# Patient Record
Sex: FEMALE | Race: BLACK OR AFRICAN AMERICAN | Employment: OTHER | URBAN - METROPOLITAN AREA
[De-identification: names, ages, dates, MRNs, and addresses within clinical notes are randomized per-mention and may not be internally consistent; named-entity substitution may affect disease eponyms.]

---

## 2017-10-02 ENCOUNTER — HOSPITAL ENCOUNTER (OUTPATIENT)
Age: 67
Discharge: HOME | End: 2017-10-02
Payer: COMMERCIAL

## 2017-10-02 LAB
ALBUMIN SERPL-MCNC: 4.2 G/DL (ref 3.5–5.2)
ALBUMIN/GLOBULIN RATIO: ABNORMAL (ref 1–2.5)
ALP BLD-CCNC: 82 U/L (ref 35–104)
ALT SERPL-CCNC: 16 U/L (ref 5–33)
ANION GAP SERPL CALCULATED.3IONS-SCNC: 10 MMOL/L (ref 9–17)
AST SERPL-CCNC: 23 U/L
BILIRUB SERPL-MCNC: 0.37 MG/DL (ref 0.3–1.2)
BUN BLDV-MCNC: 16 MG/DL (ref 8–23)
BUN/CREAT BLD: 36 (ref 9–20)
CALCIUM SERPL-MCNC: 9 MG/DL (ref 8.6–10.4)
CHLORIDE BLD-SCNC: 103 MMOL/L (ref 98–107)
CHOLESTEROL, FASTING: 225 MG/DL
CHOLESTEROL/HDL RATIO: 1.5
CO2: 29 MMOL/L (ref 20–31)
CREAT SERPL-MCNC: 0.44 MG/DL (ref 0.5–0.9)
GFR AFRICAN AMERICAN: >60 ML/MIN
GFR NON-AFRICAN AMERICAN: >60 ML/MIN
GFR SERPL CREATININE-BSD FRML MDRD: ABNORMAL ML/MIN/{1.73_M2}
GFR SERPL CREATININE-BSD FRML MDRD: ABNORMAL ML/MIN/{1.73_M2}
GLUCOSE FASTING: 102 MG/DL (ref 70–99)
HDLC SERPL-MCNC: 154 MG/DL
LDL CHOLESTEROL: 64 MG/DL (ref 0–130)
POTASSIUM SERPL-SCNC: 3.8 MMOL/L (ref 3.7–5.3)
SODIUM BLD-SCNC: 142 MMOL/L (ref 135–144)
THYROXINE, FREE: 1.41 NG/DL (ref 0.93–1.7)
TOTAL PROTEIN: 6.7 G/DL (ref 6.4–8.3)
TRIGLYCERIDE, FASTING: 33 MG/DL
TSH SERPL DL<=0.05 MIU/L-ACNC: 0.68 MIU/L (ref 0.3–5)
VLDLC SERPL CALC-MCNC: ABNORMAL MG/DL (ref 1–30)

## 2017-10-02 PROCEDURE — 80061 LIPID PANEL: CPT

## 2017-10-02 PROCEDURE — 84439 ASSAY OF FREE THYROXINE: CPT

## 2017-10-02 PROCEDURE — 80053 COMPREHEN METABOLIC PANEL: CPT

## 2017-10-02 PROCEDURE — 84443 ASSAY THYROID STIM HORMONE: CPT

## 2017-10-02 PROCEDURE — 93005 ELECTROCARDIOGRAM TRACING: CPT

## 2017-10-02 PROCEDURE — 36415 COLL VENOUS BLD VENIPUNCTURE: CPT

## 2017-10-03 LAB
EKG ATRIAL RATE: 60 BPM
EKG P AXIS: 77 DEGREES
EKG P-R INTERVAL: 138 MS
EKG Q-T INTERVAL: 430 MS
EKG QRS DURATION: 92 MS
EKG QTC CALCULATION (BAZETT): 430 MS
EKG R AXIS: 44 DEGREES
EKG T AXIS: 66 DEGREES
EKG VENTRICULAR RATE: 60 BPM

## 2017-10-16 ENCOUNTER — OFFICE VISIT (OUTPATIENT)
Dept: ORTHOPEDIC SURGERY | Age: 67
End: 2017-10-16
Payer: COMMERCIAL

## 2017-10-16 VITALS — BODY MASS INDEX: 17.18 KG/M2 | WEIGHT: 120 LBS | HEIGHT: 70 IN

## 2017-10-16 DIAGNOSIS — M17.12 PRIMARY OSTEOARTHRITIS OF LEFT KNEE: Primary | ICD-10-CM

## 2017-10-16 DIAGNOSIS — M25.561 CHRONIC PAIN OF RIGHT KNEE: ICD-10-CM

## 2017-10-16 DIAGNOSIS — M17.11 ARTHRITIS OF RIGHT KNEE: ICD-10-CM

## 2017-10-16 DIAGNOSIS — M76.52 PATELLAR TENDINITIS OF LEFT KNEE: ICD-10-CM

## 2017-10-16 DIAGNOSIS — G89.29 CHRONIC PAIN OF RIGHT KNEE: ICD-10-CM

## 2017-10-16 PROCEDURE — 99213 OFFICE O/P EST LOW 20 MIN: CPT | Performed by: ORTHOPAEDIC SURGERY

## 2017-10-16 NOTE — PROGRESS NOTES
[address] [place] [address]  Dept: [phone]  Dept Fax: [phone]        Ambulatory Follow Up      Subjective:   Brissa Parks is a 78y.o. year old female who presents to our office today for routine followup regarding her left knee pain. She was last seen approximately 2 years ago and had a corticosteroid injection to the left knee. He is felt fine ever since. She is beginning to have increasing aching type pain in the left knee. She is also noted popping and clicking right knee. The right knee is not quite as painful as the left. She isolates her pain to the anterior aspects of bilateral knees. She walks with a cane in the right hand. No other issues. Denies any numbness or tingling. Chief Complaint   Patient presents with    Joint Swelling     Bilateral knees, asking for injections        HPI    Review of Systems  Gen: no fever, chills, malaise  CV: no chest pain or palpitations  Resp: no cough or shortness of breath  Neuro: no numbness, tingling, or weakness  Msk: Bilateral knee pain    Objective :   General: Brissa Parks is a 78 y.o. female who is alert and oriented and sitting comfortably in our office. Ortho Exam  MS:  Bilateral lower extremities are warm and well perfused. Motor and sensation is grossly intact distal to the knee. Quad strength is 4+5 bilaterally. There is pain in the patellar tendon on the left and in the quad tendon on the right with resisted knee extension. Significant tenderness to palpation over the patellar tendon the left. Positive patellofemoral crepitus bilaterally. Mild lateral joint space tenderness to palpation on the left. Knee stable to varus and valgus stressing. Knee range of motion from 0-140 without pain. No pain or catching with Maximiliano's bilaterally. Neuro: alert.  oriented  Eyes: Extra-ocular muscles intact  Mouth: Oral mucosa moist. No perioral lesions  Pulm: Respirations unlabored and regular. Skin: warm, well perfused  Psych:   Patient has good fund of knowledge and displays understanging of exam, diagnosis, and plan. Radiology:  History: 72-year-old female bilateral knee pain    Comparison: 4 views of the left knee 11/16/2015    Findings: 4 views of bilateral knees are reviewed. There is tracking minimal arthritis bilaterally. There is progression in the left knee compared to prior films. The patellofemoral joint is significantly affected bilaterally. There is swelling noted anterior to the patellar tendons bilaterally. There are no lytic or blastic lesions. Impression: Tricompartmental arthritis bilaterally     Assessment:      1. Primary osteoarthritis of left knee    2. Chronic pain of right knee    3. Patellar tendinitis of left knee    4. Arthritis of right knee       Plan:   Patient's bilateral knee arthritis. Right knee is not bothering her enough for treatment today. She'll take anti-inflammatories as needed for that. As for the left knee majority of her pain is coming from her patellar tendinitis. We have prescribed physical therapy for eccentric exercises as well as quad stretching. We've also discussed ice massage using frozen [place] cups 3 times a day for 20 minutes at a time. We will delay doing another corticosteroid injection at this time. We'll save this for next visit if needed. We'll see her back in 3 months or as needed. Follow up:Return in about 3 months (around 1/16/2018), or if symptoms worsen or fail to improve. No orders of the defined types were placed in this encounter.          Orders Placed This Encounter   Procedures    XR KNEE LEFT (MIN 4 VIEWS)     Order Specific Question:   Reason for exam:     Answer:   left knee pain    XR KNEE RIGHT (MIN 4 VIEWS)     Order Specific Question:   Reason for exam:     Answer:   right knee pain and clicking    [facility]     Referral Priority:   Routine     Referral

## 2017-10-19 NOTE — PROGRESS NOTES
I performed a history and physical examination of the patient and discussed management with the resident. I reviewed the residents note and agree with the documented findings and plan of care. Any areas of disagreement are noted on the chart. I have personally evaluated this patient and have completed at least one if not all key elements of the E/M (history, physical exam, and MDM). Additional findings are as noted. I agree with the chief complaint, past medical history, past surgical history, allergies, medications, social and family history as documented unless otherwise noted below.      Electronically signed by Nicole Clement DO on 10/19/2017 at 7:45 AM

## 2017-10-24 ENCOUNTER — HOSPITAL ENCOUNTER (OUTPATIENT)
Dept: PHYSICAL THERAPY | Facility: CLINIC | Age: 67
Setting detail: THERAPIES SERIES
Discharge: HOME | End: 2017-10-24
Payer: COMMERCIAL

## 2017-10-24 PROCEDURE — 97110 THERAPEUTIC EXERCISES: CPT

## 2017-10-24 PROCEDURE — 97162 PT EVAL MOD COMPLEX 30 MIN: CPT

## 2017-10-24 NOTE — CONSULTS
Activity    [] Ultrasound  [x] Electrical Stimulation  [x] Gait Training     [x] Massage       [] Lumbar/Cervical Traction  [x] Neuromuscular Re-education [x] Cold/hotpack [] Iontophoresis: 4 mg/mL  [x] Instruction in HEP             Dexamethasone Sodium  [x] Manual Therapy             Phosphate 40-80 mAmin  [] Aquatic Therapy  [x] Vasocompression/    [] Other:       Game Ready    []  Medication allergies reviewed for use of    Dexamethasone Sodium Phosphate 4mg/ml     with iontophoresis treatments. Pt is not allergic. Frequency:  3 x/week for 18  visits        Todays Treatment:  Modalities:   Precautions: neuropathy of b/l feet    Exercises: BILATERAL   Exercise Reps/ Time Weight/ Level Comments         Prone      Hip flexor stretch                         Supine      Hip abduction-clams      Hamstring stretch - belt       Bridging                   Quad sets      Hamstring  iso sets      Glute sets       Other:    Specific Instructions for next treatment: BLE strengthening, balance, estim/pain modalities       Treatment Charges: Mins Units   [x] Evaluation       []  Low       [x]  Moderate       []  High 45 1   []  Modalities     [x]  Ther Exercise 10 1   []  Manual Therapy     []  Ther Activities     []  Aquatics     []  Vasocompression     []  Other       TOTAL TREATMENT TIME:55    Time in:11:00 a  Time Out:12:00 p    Electronically signed by: Ant King PT        Physician Signature:________________________________Date:__________________  By signing above or cosigning this note, I have reviewed this plan of care and certify a need for medically necessary rehabilitation services.      *PLEASE SIGN ABOVE AND FAX BACK ALL PAGES*

## 2017-10-26 PROCEDURE — G8979 MOBILITY GOAL STATUS: HCPCS

## 2017-10-26 PROCEDURE — G8978 MOBILITY CURRENT STATUS: HCPCS

## 2017-10-26 NOTE — CONSULTS
[] Bryan Rehman        Outpatient Physical                Therapy       [address]       Phone: [phone]       Fax: [phone] [x] [facility]       Promotion at [address]       Phone: [phone]       Fax: [phone] [] [place]. [address] [facility]    [address] [facility]     Phone: [phone]     Fax:  [phone]     Physical Therapy Plan of Care    Date:  10/26/2017  Patient: Jacky Vásquez                                       : 1950                                            MRN: [id number]  Physician: Shannon Mancuso DO                     Insurance: [employer]  Medical Diagnosis: primary  OA of the left knee                                      Rehab Codes: M17.12  Onset date: 2008                                   Next Dr's appt. : tbd     Subjective:   CC: Pt reports both knees are painful, but originally went to see MD for the L, but now both knees are \"aching. \" Pt reports she is unable to bend down d/t stiffness/pain. Pt is starting to have \"clicking\" when transitioning from sit to stand  And walking in her R knee. Pt reports she does have to hold \"tight to the railing\" with stair negotiation and reports that going down the stairs is harder d/t increased stiffness/ less mobility. Pt reports she would like to avoid surgery.      HPI: (onset date) Pt reports the pain started in . Pt reports she had to get both knees \"aspirated. \" Pt reported her last injection was 2 weeks ago. Pt states she has been very cautious lately to avoid pain. Pt reports the injections are helping. Assessment:  Problems:    [x] ? Pain: >8/10 with aggravation, >6/10 at rest                                             [x] ? ROM: hyperextension with L knee, b/l hamstring tightness                                            [x] ?  Strength:               b/l LE strength deficits buckling. LTG: (to be met in 18 treatments)  1. Pt able to negotiate >1 flight of stairs with pain rating <3/10 to improve function. 2. Pt to score <25% on the LEFS functional outcome measure to demonstrated improved quality of life/ADL completion with greater ease. 3. Pt to report pain at worst <4/10 with aggravating activities, <2/10 at rest for improved quality of life. 4. Pt able to walk 1/2 mile with reports of bilateral knee pain <2/10 to work towards pt's goal of long distance walking. 5. Balance >30 s on L to reduce falls to decrease risk for falls. Patient goals: feel better to avoid knee surgery. Treatment Plan:  [x] Therapeutic Exercise                                 [x] Modalities:  [x] Therapeutic Activity                                   [] Ultrasound                                      [x] Electrical Stimulation  [x] Gait Training                                                        [x] Massage                  [] Lumbar/Cervical Traction  [x] Neuromuscular Re-education                      [x] Cold/hotpack            [] Iontophoresis: 4 mg/mL  [x] Instruction in HEP                                                                                                                                   Dexamethasone Sodium  [x] Manual Therapy                                                                                                                                      Phosphate 40-80 mAmin  [] Aquatic Therapy                                         [x] Vasocompression/                                       [] Other:                                                                                                                Game Ready     []  Medication allergies reviewed for use of                         Dexamethasone Sodium Phosphate 4mg/ml                          with iontophoresis treatments.                         Pt is not allergic.     Frequency:  3 x/week for 18  visits              Electronically signed by: Soumya Gonzalez, PT        Physician Signature:________________________________Date:__________________  By signing above or cosigning this note, I have reviewed this plan of care and certify a need for medically necessary rehabilitation services.      *PLEASE SIGN ABOVE AND FAX BACK ALL PAGES*

## 2017-12-29 ENCOUNTER — HOSPITAL ENCOUNTER (OUTPATIENT)
Dept: PHYSICAL THERAPY | Facility: CLINIC | Age: 67
Setting detail: THERAPIES SERIES
Discharge: HOME | End: 2017-12-29
Payer: COMMERCIAL

## 2017-12-29 NOTE — DISCHARGE SUMMARY
[] Shirley Farooq        Outpatient Physical                Therapy       [address]       Phone: [phone]       Fax: [phone] [x] [facility] at [address]       Phone: [phone]       Fax: [phone] [] [place]. [address] [place] [address]     Phone: [phone]     Fax:  [phone]     Physical Therapy Discharge Note    Date: 2017      Patient: Marilee Noland  : 1950  MRN: [id number]    Physician: Dasia Rivera DO                     Insurance: 1 [facility] of the left [facility]                                      UAZVT Codes: M17.12  Onset date: 2008                                   Next 's appt. : tbd          Discharge Status:        [x] Other: Unable to reach pt with all phone numbers listed for scheduling. Electronically signed by: Christopher Moreira, PT    If you have any questions or concerns, please don't hesitate to call.   Thank you for your referral.

## 2018-06-04 ENCOUNTER — OFFICE VISIT (OUTPATIENT)
Dept: ORTHOPEDIC SURGERY | Age: 68
End: 2018-06-04
Payer: COMMERCIAL

## 2018-06-04 VITALS — WEIGHT: 118 LBS | BODY MASS INDEX: 16.89 KG/M2 | HEIGHT: 70 IN

## 2018-06-04 DIAGNOSIS — M17.11 OSTEOARTHRITIS OF RIGHT KNEE, UNSPECIFIED OSTEOARTHRITIS TYPE: Primary | ICD-10-CM

## 2018-06-04 PROCEDURE — 20610 DRAIN/INJ JOINT/BURSA W/O US: CPT | Performed by: STUDENT IN AN ORGANIZED HEALTH CARE EDUCATION/TRAINING PROGRAM

## 2018-06-04 RX ORDER — METHYLPREDNISOLONE ACETATE 80 MG/ML
80 INJECTION, SUSPENSION INTRA-ARTICULAR; INTRALESIONAL; INTRAMUSCULAR; SOFT TISSUE ONCE
Status: COMPLETED | OUTPATIENT
Start: 2018-06-04 | End: 2018-06-05

## 2018-06-04 RX ORDER — BUPIVACAINE HYDROCHLORIDE 2.5 MG/ML
2 INJECTION, SOLUTION INFILTRATION; PERINEURAL ONCE
Status: COMPLETED | OUTPATIENT
Start: 2018-06-04 | End: 2018-06-05

## 2018-06-05 RX ADMIN — BUPIVACAINE HYDROCHLORIDE 5 MG: 2.5 INJECTION, SOLUTION INFILTRATION; PERINEURAL at 10:22

## 2018-06-05 RX ADMIN — METHYLPREDNISOLONE ACETATE 80 MG: 80 INJECTION, SUSPENSION INTRA-ARTICULAR; INTRALESIONAL; INTRAMUSCULAR; SOFT TISSUE at 10:22

## 2019-05-21 ENCOUNTER — HOSPITAL ENCOUNTER (OUTPATIENT)
Age: 69
Discharge: HOME | End: 2019-05-23
Payer: COMMERCIAL

## 2019-05-21 ENCOUNTER — HOSPITAL ENCOUNTER (OUTPATIENT)
Dept: GENERAL RADIOLOGY | Age: 69
Discharge: HOME | End: 2019-05-23
Payer: COMMERCIAL

## 2019-05-21 DIAGNOSIS — M19.90 SENILE ARTHRITIS: ICD-10-CM

## 2019-05-21 PROCEDURE — 73130 X-RAY EXAM OF HAND: CPT

## 2019-06-10 ENCOUNTER — OFFICE VISIT (OUTPATIENT)
Dept: ORTHOPEDIC SURGERY | Age: 69
End: 2019-06-10
Payer: COMMERCIAL

## 2019-06-10 VITALS — BODY MASS INDEX: 16.89 KG/M2 | HEIGHT: 70 IN | WEIGHT: 117.95 LBS

## 2019-06-10 DIAGNOSIS — M19.049 HAND ARTHRITIS: Primary | ICD-10-CM

## 2019-06-10 PROCEDURE — 99213 OFFICE O/P EST LOW 20 MIN: CPT | Performed by: STUDENT IN AN ORGANIZED HEALTH CARE EDUCATION/TRAINING PROGRAM

## 2019-06-16 NOTE — PROGRESS NOTES
Respirations unlabored and regular. Skin: warm, well perfused  Psych:   Patient has good fund of knowledge and displays understanging of exam, diagnosis, and plan. Radiology:   No films at today's visit review of prior films on 5/21/19 demonstrate mild to moderate DJD at DIP joints in bilateral hands. Assessment:      1. Hand arthritis       Plan:    -Discussed potential treatment options with the patient including continuing conservative management vs fusion.   -Patient would like to continue conservative management will start her in OT for ROM and strengthenign  -Switch patient to voltaren     Follow up:Return in about 6 weeks (around 7/22/2019).     Orders Placed This Encounter   Medications    diclofenac (VOLTAREN) 50 MG EC tablet     Sig: Take 1 tablet by mouth 2 times daily     Dispense:  60 tablet     Refill:  3          Orders Placed This Encounter   Procedures   [address]     Referral Priority:   Routine     Referral Type:   Eval and Treat     Referral Reason:   Specialty Services Required     Requested Specialty:   Occupational Therapy     Number of Visits Requested:   1       Electronically signed by Misha Josue DO   Orthopedic Surgery Resident PGY-1  [address]  6/16/2019 at 9:22 AM

## 2019-08-12 DIAGNOSIS — M19.049 HAND ARTHRITIS: Primary | ICD-10-CM

## 2019-09-03 ENCOUNTER — HOSPITAL ENCOUNTER (OUTPATIENT)
Dept: GENERAL RADIOLOGY | Age: 69
Discharge: HOME | End: 2019-09-05
Payer: COMMERCIAL

## 2019-09-03 ENCOUNTER — HOSPITAL ENCOUNTER (OUTPATIENT)
Age: 69
Discharge: HOME | End: 2019-09-05
Payer: COMMERCIAL

## 2019-09-03 ENCOUNTER — HOSPITAL ENCOUNTER (OUTPATIENT)
Age: 69
Discharge: HOME | End: 2019-09-03
Payer: COMMERCIAL

## 2019-09-03 DIAGNOSIS — R52 PAIN: ICD-10-CM

## 2019-09-03 LAB
ABSOLUTE EOS #: <0.03 K/UL (ref 0–0.44)
ABSOLUTE IMMATURE GRANULOCYTE: <0.03 K/UL (ref 0–0.3)
ABSOLUTE LYMPH #: 1.16 K/UL (ref 1.1–3.7)
ABSOLUTE MONO #: 0.3 K/UL (ref 0.1–1.2)
ABSOLUTE RETIC #: 0.05 M/UL (ref 0.03–0.08)
ALBUMIN SERPL-MCNC: 4.2 G/DL (ref 3.5–5.2)
ALBUMIN/GLOBULIN RATIO: 1.5 (ref 1–2.5)
ALP BLD-CCNC: 93 U/L (ref 35–104)
ALT SERPL-CCNC: 15 U/L (ref 5–33)
ANION GAP SERPL CALCULATED.3IONS-SCNC: 10 MMOL/L (ref 9–17)
AST SERPL-CCNC: 24 U/L
BASOPHILS # BLD: 0 % (ref 0–2)
BASOPHILS ABSOLUTE: <0.03 K/UL (ref 0–0.2)
BILIRUB SERPL-MCNC: 0.37 MG/DL (ref 0.3–1.2)
BUN BLDV-MCNC: 17 MG/DL (ref 8–23)
BUN/CREAT BLD: ABNORMAL (ref 9–20)
C-REACTIVE PROTEIN: 0.3 MG/L (ref 0–5)
CALCIUM SERPL-MCNC: 9.3 MG/DL (ref 8.6–10.4)
CHLORIDE BLD-SCNC: 105 MMOL/L (ref 98–107)
CHOLESTEROL, FASTING: 218 MG/DL
CHOLESTEROL/HDL RATIO: 1.9
CO2: 26 MMOL/L (ref 20–31)
CREAT SERPL-MCNC: 0.45 MG/DL (ref 0.5–0.9)
DIFFERENTIAL TYPE: ABNORMAL
EOSINOPHILS RELATIVE PERCENT: 0 % (ref 1–4)
FERRITIN: 20 UG/L (ref 13–150)
FOLATE: 19.9 NG/ML
GFR AFRICAN AMERICAN: >60 ML/MIN
GFR NON-AFRICAN AMERICAN: >60 ML/MIN
GFR SERPL CREATININE-BSD FRML MDRD: ABNORMAL ML/MIN/{1.73_M2}
GFR SERPL CREATININE-BSD FRML MDRD: ABNORMAL ML/MIN/{1.73_M2}
GLUCOSE BLD-MCNC: 87 MG/DL (ref 70–99)
HCT VFR BLD CALC: 40.9 % (ref 36.3–47.1)
HDLC SERPL-MCNC: 115 MG/DL
HEMOGLOBIN: 12.5 G/DL (ref 11.9–15.1)
IMMATURE GRANULOCYTES: 0 %
IMMATURE RETIC FRACT: 6.1 % (ref 2.7–18.3)
IRON SATURATION: 27 % (ref 20–55)
IRON: 101 UG/DL (ref 37–145)
LDL CHOLESTEROL: 94 MG/DL (ref 0–130)
LYMPHOCYTES # BLD: 40 % (ref 24–43)
MCH RBC QN AUTO: 28.9 PG (ref 25.2–33.5)
MCHC RBC AUTO-ENTMCNC: 30.6 G/DL (ref 28.4–34.8)
MCV RBC AUTO: 94.5 FL (ref 82.6–102.9)
MONOCYTES # BLD: 10 % (ref 3–12)
NRBC AUTOMATED: 0 PER 100 WBC
PDW BLD-RTO: 12.6 % (ref 11.8–14.4)
PLATELET # BLD: 176 K/UL (ref 138–453)
PLATELET ESTIMATE: ABNORMAL
PMV BLD AUTO: 10.6 FL (ref 8.1–13.5)
POTASSIUM SERPL-SCNC: 4 MMOL/L (ref 3.7–5.3)
RBC # BLD: 4.33 M/UL (ref 3.95–5.11)
RBC # BLD: ABNORMAL 10*6/UL
RETIC %: 1.2 % (ref 0.5–1.9)
RETIC HEMOGLOBIN: 35 PG (ref 28.2–35.7)
RHEUMATOID FACTOR: 17.2 IU/ML
SEDIMENTATION RATE, ERYTHROCYTE: 2 MM (ref 0–20)
SEG NEUTROPHILS: 50 % (ref 36–65)
SEGMENTED NEUTROPHILS ABSOLUTE COUNT: 1.39 K/UL (ref 1.5–8.1)
SODIUM BLD-SCNC: 141 MMOL/L (ref 135–144)
T3 FREE: 2.97 PG/ML (ref 2.02–4.43)
THYROXINE, FREE: 1.5 NG/DL (ref 0.93–1.7)
TOTAL IRON BINDING CAPACITY: 372 UG/DL (ref 250–450)
TOTAL PROTEIN: 7 G/DL (ref 6.4–8.3)
TRIGLYCERIDE, FASTING: 43 MG/DL
TSH SERPL DL<=0.05 MIU/L-ACNC: 0.59 MIU/L (ref 0.3–5)
UNSATURATED IRON BINDING CAPACITY: 271 UG/DL (ref 112–347)
URIC ACID: 2.1 MG/DL (ref 2.4–5.7)
VITAMIN B-12: 1018 PG/ML (ref 232–1245)
VLDLC SERPL CALC-MCNC: ABNORMAL MG/DL (ref 1–30)
WBC # BLD: 2.9 K/UL (ref 3.5–11.3)
WBC # BLD: ABNORMAL 10*3/UL

## 2019-09-03 PROCEDURE — 84443 ASSAY THYROID STIM HORMONE: CPT

## 2019-09-03 PROCEDURE — 73562 X-RAY EXAM OF KNEE 3: CPT

## 2019-09-03 PROCEDURE — 82746 ASSAY OF FOLIC ACID SERUM: CPT

## 2019-09-03 PROCEDURE — 85045 AUTOMATED RETICULOCYTE COUNT: CPT

## 2019-09-03 PROCEDURE — 85025 COMPLETE CBC W/AUTO DIFF WBC: CPT

## 2019-09-03 PROCEDURE — 83550 IRON BINDING TEST: CPT

## 2019-09-03 PROCEDURE — 84550 ASSAY OF BLOOD/URIC ACID: CPT

## 2019-09-03 PROCEDURE — 86140 C-REACTIVE PROTEIN: CPT

## 2019-09-03 PROCEDURE — 80053 COMPREHEN METABOLIC PANEL: CPT

## 2019-09-03 PROCEDURE — 86038 ANTINUCLEAR ANTIBODIES: CPT

## 2019-09-03 PROCEDURE — 36415 COLL VENOUS BLD VENIPUNCTURE: CPT

## 2019-09-03 PROCEDURE — 84439 ASSAY OF FREE THYROXINE: CPT

## 2019-09-03 PROCEDURE — 82728 ASSAY OF FERRITIN: CPT

## 2019-09-03 PROCEDURE — 86431 RHEUMATOID FACTOR QUANT: CPT

## 2019-09-03 PROCEDURE — 83540 ASSAY OF IRON: CPT

## 2019-09-03 PROCEDURE — 80061 LIPID PANEL: CPT

## 2019-09-03 PROCEDURE — 82607 VITAMIN B-12: CPT

## 2019-09-03 PROCEDURE — 84481 FREE ASSAY (FT-3): CPT

## 2019-09-03 PROCEDURE — 85651 RBC SED RATE NONAUTOMATED: CPT

## 2019-09-03 PROCEDURE — 72170 X-RAY EXAM OF PELVIS: CPT

## 2019-09-03 PROCEDURE — 86200 CCP ANTIBODY: CPT

## 2019-09-04 LAB
ANTI-NUCLEAR ANTIBODY (ANA): NEGATIVE
CCP IGG ANTIBODIES: <1.5 U/ML

## 2019-11-04 ENCOUNTER — OFFICE VISIT (OUTPATIENT)
Dept: ORTHOPEDIC SURGERY | Age: 69
End: 2019-11-04
Payer: COMMERCIAL

## 2019-11-04 VITALS — WEIGHT: 117.95 LBS | HEIGHT: 70 IN | BODY MASS INDEX: 16.89 KG/M2

## 2019-11-04 DIAGNOSIS — M19.042 PRIMARY OSTEOARTHRITIS OF BOTH HANDS: Primary | ICD-10-CM

## 2019-11-04 DIAGNOSIS — M19.041 PRIMARY OSTEOARTHRITIS OF BOTH HANDS: Primary | ICD-10-CM

## 2019-11-04 PROCEDURE — 99214 OFFICE O/P EST MOD 30 MIN: CPT | Performed by: ORTHOPAEDIC SURGERY

## 2019-11-04 RX ORDER — MELOXICAM 15 MG/1
15 TABLET ORAL DAILY
Qty: 30 TABLET | Refills: 3 | Status: SHIPPED | OUTPATIENT
Start: 2019-11-04

## 2020-02-18 ENCOUNTER — HOSPITAL ENCOUNTER (OUTPATIENT)
Age: 70
Setting detail: SPECIMEN
Discharge: HOME | End: 2020-02-18
Payer: COMMERCIAL

## 2020-02-18 LAB
ABSOLUTE EOS #: 0.05 K/UL (ref 0–0.44)
ABSOLUTE IMMATURE GRANULOCYTE: <0.03 K/UL (ref 0–0.3)
ABSOLUTE LYMPH #: 1.24 K/UL (ref 1.1–3.7)
ABSOLUTE MONO #: 0.37 K/UL (ref 0.1–1.2)
BASOPHILS # BLD: 1 % (ref 0–2)
BASOPHILS ABSOLUTE: <0.03 K/UL (ref 0–0.2)
DIFFERENTIAL TYPE: ABNORMAL
EOSINOPHILS RELATIVE PERCENT: 2 % (ref 1–4)
HCT VFR BLD CALC: 40.2 % (ref 36.3–47.1)
HEMOGLOBIN: 12.4 G/DL (ref 11.9–15.1)
IMMATURE GRANULOCYTES: 0 %
LYMPHOCYTES # BLD: 45 % (ref 24–43)
MCH RBC QN AUTO: 29.2 PG (ref 25.2–33.5)
MCHC RBC AUTO-ENTMCNC: 30.8 G/DL (ref 28.4–34.8)
MCV RBC AUTO: 94.6 FL (ref 82.6–102.9)
MONOCYTES # BLD: 14 % (ref 3–12)
NRBC AUTOMATED: 0 PER 100 WBC
PDW BLD-RTO: 13.1 % (ref 11.8–14.4)
PLATELET # BLD: 184 K/UL (ref 138–453)
PLATELET ESTIMATE: ABNORMAL
PMV BLD AUTO: 11.7 FL (ref 8.1–13.5)
RBC # BLD: 4.25 M/UL (ref 3.95–5.11)
RBC # BLD: ABNORMAL 10*6/UL
SEG NEUTROPHILS: 38 % (ref 36–65)
SEGMENTED NEUTROPHILS ABSOLUTE COUNT: 1.02 K/UL (ref 1.5–8.1)
WBC # BLD: 2.7 K/UL (ref 3.5–11.3)
WBC # BLD: ABNORMAL 10*3/UL

## 2020-02-19 LAB
ALBUMIN SERPL-MCNC: 3.8 G/DL (ref 3.5–5.2)
ALBUMIN/GLOBULIN RATIO: 1.4 (ref 1–2.5)
ALP BLD-CCNC: 88 U/L (ref 35–104)
ALT SERPL-CCNC: 18 U/L (ref 5–33)
ANION GAP SERPL CALCULATED.3IONS-SCNC: 12 MMOL/L (ref 9–17)
AST SERPL-CCNC: 25 U/L
BILIRUB SERPL-MCNC: 0.42 MG/DL (ref 0.3–1.2)
BUN BLDV-MCNC: 16 MG/DL (ref 8–23)
BUN/CREAT BLD: ABNORMAL (ref 9–20)
CALCIUM SERPL-MCNC: 8.9 MG/DL (ref 8.6–10.4)
CHLORIDE BLD-SCNC: 103 MMOL/L (ref 98–107)
CHOLESTEROL/HDL RATIO: 1.9
CHOLESTEROL: 225 MG/DL
CO2: 25 MMOL/L (ref 20–31)
CREAT SERPL-MCNC: 0.41 MG/DL (ref 0.5–0.9)
GFR AFRICAN AMERICAN: >60 ML/MIN
GFR NON-AFRICAN AMERICAN: >60 ML/MIN
GFR SERPL CREATININE-BSD FRML MDRD: ABNORMAL ML/MIN/{1.73_M2}
GFR SERPL CREATININE-BSD FRML MDRD: ABNORMAL ML/MIN/{1.73_M2}
GLUCOSE BLD-MCNC: 85 MG/DL (ref 70–99)
HDLC SERPL-MCNC: 118 MG/DL
LDL CHOLESTEROL: 100 MG/DL (ref 0–130)
POTASSIUM SERPL-SCNC: 3.9 MMOL/L (ref 3.7–5.3)
SODIUM BLD-SCNC: 140 MMOL/L (ref 135–144)
THYROXINE, FREE: 1.46 NG/DL (ref 0.93–1.7)
TOTAL PROTEIN: 6.5 G/DL (ref 6.4–8.3)
TRIGL SERPL-MCNC: 36 MG/DL
TSH SERPL DL<=0.05 MIU/L-ACNC: 1.01 MIU/L (ref 0.3–5)
VLDLC SERPL CALC-MCNC: ABNORMAL MG/DL (ref 1–30)

## 2020-05-27 ENCOUNTER — OFFICE VISIT (OUTPATIENT)
Dept: ORTHOPEDIC SURGERY | Age: 70
End: 2020-05-27
Payer: COMMERCIAL

## 2020-05-27 VITALS — WEIGHT: 117.95 LBS | BODY MASS INDEX: 16.89 KG/M2 | HEIGHT: 70 IN

## 2020-05-27 PROCEDURE — 99213 OFFICE O/P EST LOW 20 MIN: CPT | Performed by: STUDENT IN AN ORGANIZED HEALTH CARE EDUCATION/TRAINING PROGRAM

## 2020-05-27 NOTE — PROGRESS NOTES
through  like motions. Median/ulnar/radial nerve SILT. Radial/Median/Ulnar/PIN/AIN motor strength 5/5. Hand warm and well perfused. Radial/ulnar pulse 2+ with BCR. Radiology:   Previous radiographs were reviewed with the patient in the office. Assessment:      1. Primary osteoarthritis of both hands         Plan:     A lengthy discussion was had with the patient today regarding bilateral hand arthritis. Previous radiographs were reviewed with the patient in the office today. She understands the history and progression of her disease process. The most affected joint for her at this time is the DIP joint of the pinky finger on her right hand. I believe that she may benefit from some conservative treatment options including oral anti-inflammatories, occupational therapy. We also discussed since she has not had relief with conservative treatment that we recommend her see a hand surgeon for further evaluation to discuss surgical intervention, possible DIP fusion of the right 5th DIP. She understands that the only other option after conservative measures are exhausted, is fusion of the joint to reduce motion and decreased pain. We also provided her with a stack splint for protection to that finger while doing activities such as the dishes. She may follow up as needed for there hands but we recommend a second opinion with a hand surgeon.  Referral placed.     ----------------------------------------  Eloisa Ramirez DO  PGY-2, Department of [address], [place], [place]

## 2020-05-28 ENCOUNTER — TELEPHONE (OUTPATIENT)
Dept: ORTHOPEDIC SURGERY | Age: 70
End: 2020-05-28

## 2020-06-12 ENCOUNTER — TELEPHONE (OUTPATIENT)
Dept: ORTHOPEDIC SURGERY | Age: 70
End: 2020-06-12

## 2020-07-24 ENCOUNTER — TELEPHONE (OUTPATIENT)
Dept: ORTHOPEDIC SURGERY | Age: 70
End: 2020-07-24

## 2021-11-24 ENCOUNTER — HOSPITAL ENCOUNTER (OUTPATIENT)
Dept: ULTRASOUND IMAGING | Age: 71
Discharge: HOME | End: 2021-11-26
Payer: MEDICAID

## 2021-11-24 DIAGNOSIS — R22.1 NECK MASS: ICD-10-CM

## 2021-11-24 PROCEDURE — 76536 US EXAM OF HEAD AND NECK: CPT

## 2022-02-01 ENCOUNTER — HOSPITAL ENCOUNTER (OUTPATIENT)
Age: 72
Setting detail: SPECIMEN
Discharge: HOME | End: 2022-02-01

## 2022-02-02 LAB
CHOLESTEROL, FASTING: 213 MG/DL
CHOLESTEROL/HDL RATIO: 1.8
HDLC SERPL-MCNC: 116 MG/DL
LDL CHOLESTEROL: 91 MG/DL (ref 0–130)
TRIGLYCERIDE, FASTING: 28 MG/DL
VLDLC SERPL CALC-MCNC: ABNORMAL MG/DL (ref 1–30)

## 2022-03-24 ENCOUNTER — OFFICE VISIT (OUTPATIENT)
Dept: ORTHOPEDIC SURGERY | Age: 72
End: 2022-03-24
Payer: COMMERCIAL

## 2022-03-24 VITALS — WEIGHT: 117 LBS | HEIGHT: 70 IN | BODY MASS INDEX: 16.75 KG/M2

## 2022-03-24 DIAGNOSIS — M25.561 ACUTE PAIN OF RIGHT KNEE: Primary | ICD-10-CM

## 2022-03-24 DIAGNOSIS — M17.11 PRIMARY OSTEOARTHRITIS OF RIGHT KNEE: ICD-10-CM

## 2022-03-24 PROCEDURE — 99213 OFFICE O/P EST LOW 20 MIN: CPT | Performed by: STUDENT IN AN ORGANIZED HEALTH CARE EDUCATION/TRAINING PROGRAM

## 2022-03-24 PROCEDURE — 20610 DRAIN/INJ JOINT/BURSA W/O US: CPT | Performed by: STUDENT IN AN ORGANIZED HEALTH CARE EDUCATION/TRAINING PROGRAM

## 2022-03-24 RX ORDER — IBUPROFEN 800 MG/1
800 TABLET ORAL 3 TIMES DAILY PRN
Qty: 42 TABLET | Refills: 0 | Status: SHIPPED | OUTPATIENT
Start: 2022-03-24 | End: 2022-04-07

## 2022-03-24 NOTE — PROGRESS NOTES
[address]  [address] [facility] [place] [facility] [place]  Dept: [phone]  Dept Fax: [phone]        Ambulatory Follow Up    Subjective:     Chief Complaint   Patient presents with    Follow-up     RIGHT KNEE PAIN        Marimar Martinez is a 80y.o. year old female who presents to our office today for evaluation of her right knee pain. Patient has a history of right knee osteoarthritis that we did treat with a corticosteroid injection in 2018. She states she received significant relief from this injection for a long period of time. She states that roughly in October of this year she fell onto her right knee causing acute pain in the right knee. She notes the pain will wake her up from night sleep. She does admit to some mechanical symptoms at this time including clicking and popping as well as sensation of locking of her right knee while she is sleeping. She states she is still able to ambulate and perform activities of daily living without difficulty. Not had any other treatment to her right knee since our last injection in 2018 as she states it is done quite well. Denies any numbness or tingling to right lower extremity. She states she also had left knee pain after the fall but states the left knee is doing well today. Review of Systems:   General: Negative for fever and chills. Cardiovascular: Negative for chest pain and palpitations. Musculoskeletal: Positive for joint pain (right knee). Neurological: Negative for numbness & tingling. 10 remaining systems reviewed and negative. Objective :   General: AAOx3, NAD, appears appropriate stated age  Ortho Exam  MS:   RLE: Right knee range of motion from 0 to 130 degrees. Tender palpation over the medial and lateral joint lines as well as over the patellar tendon. She is able to straight leg raise against gravity and sustain this.   Skin is intact with no wounds or lacerations. Mild effusion noted. Compartments are soft and compressible. EHL/FHL/TA/GS complex motor intact. Sural, saphenous, superificial/deep peroneal, and plantar nerve distribution SILT. Dorsalis pedis/posterior tibial pulses 2+ with BCR. Negative anterior/Posterior Drawer knee exam.  Negative Lachman's test.  No varus/valgus instability without pain. Negative Maximiliano's test.    CV: no obvious JVD, no dependent edema, distal pulses 2+  Respiratory: chest rise symmetric, unlabored respirations, no audible wheezing  Skin: warm, well perfused, no obvious rashes or lesions  Psych: Patient displays understanding of exam, diagnosis, and plan. Radiology:   History:   Right knee pain    Comparison:   9/3/2019    Findings:   3 x-ray views of the right knee include AP, lateral, and sunrise views demonstrate no acute fractures, dislocations, or subluxations. There is degenerative changes including joint space narrowing and tricompartmental osteophytosis. Impression:  Right knee with moderate degenerative changes    Assessment:      1. Acute pain of right knee    2. Primary osteoarthritis of right knee         Plan: We reviewed the x-ray imaging with the patient in the office today. We discussed the imaging shows signs of degenerative changes within the right knee which were previously shown in her prior films. We discussed that she likely could have sustained an exacerbation of right knee osteoarthritis. We also discussed given her mechanical symptoms she could also have sustained an underlying meniscal tear. We discussed treatment options including corticosteroid injection, activity modification, anti-inflammatory medications, therapy, and enhanced imaging such as an MRI. Patient continues to state she is not interested in discussing any forms of surgical treatment options i.e. total knee arthroplasty.   At this point we recommended right knee corticosteroid injection with physical therapy which the patient agreed to. See procedure note for details. She was also provided with a prescription for ibuprofen 800mg in the office today. She will follow-up in the office in 6 weeks time for reevaluation of her right knee pain. Injection procedure note  The alternatives, benefits, and risks were discussed with the patient. After answering all questions to the patient's satisfaction, the patient agreed to proceed forward with injection and gave verbal consent for the procedure. With the patient's permission, appropriate anatomic landmarks were identified and the right knee joint was prepped in a sterile fashion using alcohol and/or betadine. A 21 gauge needle was then used to inject 2cc 0.25% marcaine plain and 80mg depo medrol into the joint. The injection was advanced without resistance confirming appropriate position. The patient tolerated the procedure well and the site was dressed with a band-aid. Patient was advised to ice the area for 15-20 minutes to relieve any injection site related pain. Patient was advised to contact nurse if area becomes swollen, hot, erythematous, or painful, or to go to the emergency room after business hours. Follow up:Return in about 6 weeks (around 5/5/2022) for Right knee reevaluation.     Orders Placed This Encounter   Medications    ibuprofen (ADVIL;MOTRIN) 800 MG tablet     Sig: Take 1 tablet by mouth 3 times daily as needed for Pain     Dispense:  42 tablet     Refill:  0          Orders Placed This Encounter   Procedures    XR KNEE RIGHT (3 VIEWS)     Standing Status:   Future     Number of Occurrences:   1     Standing Expiration Date:   3/22/2023    External Referral To Physical Therapy     Referral Priority:   Routine     Referral Type:   Eval and Treat     Referral Reason:   Specialty Services Required     Requested Specialty:   Physical Therapy     Number of Visits Requested:   Wilber Batista 57, DO  Orthopedic Surgery Resident, PGY-3  [address] [facility] [place], [place]

## 2022-03-31 RX ORDER — METHYLPREDNISOLONE ACETATE 80 MG/ML
80 INJECTION, SUSPENSION INTRA-ARTICULAR; INTRALESIONAL; INTRAMUSCULAR; SOFT TISSUE ONCE
Status: COMPLETED | OUTPATIENT
Start: 2022-03-31 | End: 2022-03-31

## 2022-03-31 RX ADMIN — METHYLPREDNISOLONE ACETATE 80 MG: 80 INJECTION, SUSPENSION INTRA-ARTICULAR; INTRALESIONAL; INTRAMUSCULAR; SOFT TISSUE at 10:58

## 2022-04-01 ENCOUNTER — TELEPHONE (OUTPATIENT)
Dept: ORTHOPEDIC SURGERY | Age: 72
End: 2022-04-01

## 2022-04-01 NOTE — TELEPHONE ENCOUNTER
Patient called in c/o ibuprofen 800 tid giving her stomachache. States she as given a knee injection on 03/24/2022. She didn't c/o knee pain so much but concerned with ibuprofen bothering her stomach. Encouraged her to try cutting back on the ibuprofen, ENSURED HER WHEN TAKING IBUPROFEN TAKE WITH FOOD AND PLENTY OF WATER . And can also supplement with otc tylenol. Encouraged her to call office if symptoms are still no better.

## 2022-05-05 ENCOUNTER — TELEPHONE (OUTPATIENT)
Dept: ORTHOPEDIC SURGERY | Age: 72
End: 2022-05-05

## 2022-05-05 NOTE — TELEPHONE ENCOUNTER
Called patient. Call goes straight to . Can not leave message. Patient has an external referral to PT. She can go anywhere with it.

## 2022-05-05 NOTE — TELEPHONE ENCOUNTER
Patient canceled her appt today as she wanted to see physical therapy a few times before she comes in. However, the PT office she was going to made her feel unsafe. Due to lack of covid precautions.      She is requesting a return call to discuss with clinical   Thank you

## 2022-05-19 ENCOUNTER — OFFICE VISIT (OUTPATIENT)
Dept: ORTHOPEDIC SURGERY | Age: 72
End: 2022-05-19
Payer: COMMERCIAL

## 2022-05-19 VITALS — HEIGHT: 70 IN | WEIGHT: 117 LBS | BODY MASS INDEX: 16.75 KG/M2

## 2022-05-19 DIAGNOSIS — M17.11 PRIMARY OSTEOARTHRITIS OF RIGHT KNEE: Primary | ICD-10-CM

## 2022-05-19 PROCEDURE — 99213 OFFICE O/P EST LOW 20 MIN: CPT

## 2022-05-19 NOTE — PROGRESS NOTES
[address] [facility]  [facility] [facility] [place] [address]  Dept: [phone]  Dept Fax: [phone]        Orthopaedic Clinic Follow Up      Subjective:     Ashtyn Woods is a 71y.o. year old female who presents to the clinic today for routine follow up chronic right knee pain last in clinic on 3/24/2022 where she received a corticosteroid injection with good relief that is maintained above baseline since last visit. Patient was separately referred to physical therapy by her nurse lexyer where she underwent 3 physical therapy sessions with minimal improvement with the last one being over a week ago. Patient denies any worsening of symptoms. Patient has been doing home exercises. Patient utilizes Mobic with minimal pain relief. Patient does not utilize ice or knee brace at this time. Patient denies numbness, tingling, weakness. Patient has more difficulty with going downstairs and upstairs. Patient no other orthopedic by this time    Review of Systems  Gen: no fever, chills, malaise  CV: no chest pain or palpitations  Resp: no cough or shortness of breath  GI: no nausea, vomiting, diarrhea, or constipation  Neuro: no seizures, vertigo, or headache  Msk: Right knee pain and swelling  10 remaining systems reviewed and negative    Objective : There were no vitals filed for this visit. Body mass index is 16.79 kg/m². General: No acute distress, resting comfortably in the clinic  Neuro: alert. oriented  Eyes: Extra-ocular muscles intact  Pulm: Respirations unlabored and regular. Skin: warm, well perfused  Psych:   Patient has good fund of knowledge and displays understanding of exam, diagnosis, and plan. MSK:    Right lower extremity: Skin intact with moderate swelling diffusely inferior to the patella. No erythema, ecchymosis, or fluctuance. right knee range of motion from 0 to 130 degrees.   Tender palpation over the medial and lateral joint lines as well as over the patellar tendon. She is able to straight leg raise against gravity and sustain against resistance. Compartments are soft and compressible. EHL/FHL/TA/GS complex motor intact. Sural, saphenous, superificial/deep peroneal, and plantar nerve distribution SILT. Dorsalis pedis/posterior tibial pulses 2+ with BCR. Negative anterior/Posterior Drawer knee exam.  Negative Lachman's test.  No varus/valgus instability without pain. Negative Maximiliano's test.    Radiology:  Previous films reviewed     Assessment:   71y.o. year old female with chronic right knee pain  Plan:   Patient was evaluated and discussed etiology of her right knee pain. Patient was encouraged to continue with home exercises and provided with an educational sheet for prepatellar bursitis and patellofemoral symptoms. Patient is provided with a knee brace at today's visit as well as as needed with activity. Patient was encouraged to ice and elevate her leg at the end of the day 2 x 20 minutes daily each hour. Patient was given a prescription for Voltaren topical gel as well as oral diclofenac to take as needed which was sent to her requested pharmacy. Patient was instructed to follow-up in 2 months time to reevaluate. Patient understood and agreed with the plan with all questions being answered to the patient's satisfaction at the time of visit. Follow up:Return in about 2 months (around 7/19/2022). Orders Placed This Encounter   Medications    diclofenac sodium (VOLTAREN) 1 % GEL     Sig: Apply 4 g topically 4 times daily     Dispense:  350 g     Refill:  5    diclofenac (VOLTAREN) 50 MG EC tablet     Sig: Take 1 tablet by mouth 3 times daily (with meals)     Dispense:  60 tablet     Refill:  5          No orders of the defined types were placed in this encounter.       Electronically signed by Jacey Combs DO Orthopedic Surgery Resident on 5/19/2022 at 11:30 AM    This note is created with the assistance of a speech recognition program.  While intending to generate a document that actually reflects the content of the visit, the document can still have some errors including those of syntax and sound a like substitutions which may escape proof reading.   In such instances, actual meaning can be extrapolated by contextual diversion

## 2022-08-10 ENCOUNTER — OFFICE VISIT (OUTPATIENT)
Dept: ORTHOPEDIC SURGERY | Age: 72
End: 2022-08-10
Payer: COMMERCIAL

## 2022-08-10 VITALS — HEIGHT: 70 IN | WEIGHT: 117 LBS | BODY MASS INDEX: 16.75 KG/M2

## 2022-08-10 DIAGNOSIS — M17.11 PRIMARY OSTEOARTHRITIS OF RIGHT KNEE: Primary | ICD-10-CM

## 2022-08-10 PROCEDURE — 1123F ACP DISCUSS/DSCN MKR DOCD: CPT | Performed by: STUDENT IN AN ORGANIZED HEALTH CARE EDUCATION/TRAINING PROGRAM

## 2022-08-10 PROCEDURE — 99213 OFFICE O/P EST LOW 20 MIN: CPT | Performed by: STUDENT IN AN ORGANIZED HEALTH CARE EDUCATION/TRAINING PROGRAM

## 2022-08-10 NOTE — PROGRESS NOTES
[address]  [address] [facility] [place]  Dept: [phone]  Dept Fax: [phone]        Ambulatory Follow Up    Subjective:     Chief Complaint   Patient presents with    Follow-up     R tremayne Haley is a 71y.o. year old female who presents to our office today for routine followup regarding right knee pain. Patient has right knee osteoarthritis that we have been treating conservatively to date. She last received a corticosteroid injection on 3/24/2022 with significant relief in her symptoms. She states her knee pain has been controlled as of recently. She admits to clicking and popping in the knee but denies any locking sensation. She has trialed anti-inflammatory medications including ibuprofen. She did go to physical therapy which they provided home exercises. She denies any numbness or tingling the right lower extremity. Review of Systems:   General: Negative for fever and chills. Cardiovascular: Negative for chest pain and palpitations. Musculoskeletal: Positive for joint pain (right knee). Neurological: Negative for numbness & tingling. 10 remaining systems reviewed and negative. Objective :   General: AAOx3, NAD, appears appropriate stated age  Ortho Exam  MS:   RLE: Tender to palpation over the medial and lateral joint lines. Mildly tender palpation over the inferior pole the patella. Mildly tender to palpation over the tibial tubercle. She is able to actively straight leg raise against gravity without difficulty. She is able to walk with a nonantalgic gait. She has full pain-free right knee range of motion. Compartments are soft and compressible. EHL/FHL/TA/GS complex motor intact. Sural, saphenous, superificial/deep peroneal, and plantar nerve distribution SILT. Posterior tibial pulse 2+ with BCR.   Negative anterior/Posterior Drawer knee exam.  Negative Lachman's test.  No varus/valgus instability without pain. CV: no obvious JVD, no dependent edema, distal pulses 2+  Respiratory: chest rise symmetric, unlabored respirations, no audible wheezing  Skin: warm, well perfused, no obvious rashes or lesions  Psych: Patient displays understanding of exam, diagnosis, and plan. Radiology:   History:   Right knee osteoarthritis    Comparison:   3/4/2022    Findings:   3 weightbearing x-ray views of the right knee including AP, lateral, oblique and 1 merchant view demonstrate lateral joint space narrowing, osteophyte formation, and subchondral cyst formation. There is no evidence of acute fractures, dislocation, or subluxations. There is also joint space narrowing, osteophyte formation, and subchondral sclerosis/cyst formation in the patellofemoral joint on the merchant view. Impression:  Right knee degenerative joint disease as dictated above. Assessment:      1. Primary osteoarthritis of right knee         Plan: To the fact the patient states her right knee feels to be doing better today we discussed continued current treatment therapy including home exercise regiment, and activity modification, and anti-inflammatory medication as needed. We discussed that if she were to have any increase in pain we could always trial a repeat corticosteroid injection since she did receive relief from her previous injection. She was in agreement of the plan. She may follow-up in our office as needed. Follow up:Return if symptoms worsen or fail to improve. No orders of the defined types were placed in this encounter. No orders of the defined types were placed in this encounter.       Chen Valero, DO  Orthopedic Surgery Resident, PGY-4  [facility]

## 2022-11-29 ENCOUNTER — TELEPHONE (OUTPATIENT)
Dept: ORTHOPEDIC SURGERY | Age: 72
End: 2022-11-29

## 2022-11-29 NOTE — TELEPHONE ENCOUNTER
Patient called in response to the No Show Letter she received for the 11/16/22 missed appointment. She said she had no idea she had an appointment on that day; she did not schedule the appointment; and she did not even receive a reminder call for the appointment. She said she would never disrespect the doctor by not showing up for an appointment. She also added this is the second letter for missed appointments with this office and is not sure what is going on. Please return her call to [phone]. Thank you.

## 2022-12-22 NOTE — TELEPHONE ENCOUNTER
Patient made last no show appointment via her previous clinic appointment, happy to review with patient should she have further questions.

## 2023-03-01 ENCOUNTER — OFFICE VISIT (OUTPATIENT)
Dept: ORTHOPEDIC SURGERY | Age: 73
End: 2023-03-01

## 2023-03-01 VITALS — WEIGHT: 121.6 LBS | HEIGHT: 70 IN | BODY MASS INDEX: 17.41 KG/M2

## 2023-03-01 DIAGNOSIS — M79.641 RIGHT HAND PAIN: Primary | ICD-10-CM

## 2023-03-01 RX ORDER — METHYLPREDNISOLONE 4 MG/1
TABLET ORAL
Qty: 1 KIT | Refills: 0 | Status: SHIPPED | OUTPATIENT
Start: 2023-03-01 | End: 2023-03-07

## 2023-03-01 NOTE — PROGRESS NOTES
[address]  [address] [facility] [place]  Dept: [phone]  Dept Fax: [phone]        Ambulatory Follow Up    Subjective:     Chief Complaint   Patient presents with    Knee Pain     Right knee pain       April Gomez is a 71y.o. year old female who presents to our office today for evaluation of right hand pain. Patient states that roughly 2 weeks ago she had bumped her right hand against a hard object and had acute pain over the dorsum of the right fifth metacarpal.  She states there is also associated swelling. She states continued pain to this region. She notes the swelling is intermittent and can worsen throughout the day. She states she was seen at an urgent care facility and was provided ibuprofen. She states that also roughly to 3 months ago she had a flareup of left knee pain and swelling which she states subsided following ibuprofen usage. Her main concern today is her right hand pain. She states this is new since she bumped her hand and has not happened previously. She denies any dysesthesias to the right upper extremity. She does have a history of rheumatoid arthritis. Review of Systems:   General: Negative for fever and chills. Cardiovascular: Negative for chest pain and palpitations. Musculoskeletal: Positive for right hand pain. Neurological: Negative for numbness & tingling. 10 remaining systems reviewed and negative. Objective :   General: AAOx3, NAD, appears appropriate stated age  Ortho Exam  MS:   RUE: Tender to palpation over the dorsum of the fifth metacarpal.  There is a soft nodule noted over the dorsal fifth metacarpal shaft that is tender to palpation. She does demonstrate active extension of her MCP joint with extension lag of the fifth DIP.   She does have limitation in wrist extension when compared to her contralateral wrist and is able to actively extend the wrist to 8 degrees. No palpable step off noted on palpation of extensor tendons. Skin intact. Ulnar/Median/AIN/PIN motor intact. Median/Radial/Ulnar nerve SILT. Radial pulse 2+ with BCR. CV: no obvious JVD, no dependent edema, distal pulses 2+  Respiratory: chest rise symmetric, unlabored respirations, no audible wheezing  Skin: warm, well perfused, no obvious rashes or lesions  Psych: Patient displays understanding of exam, diagnosis, and plan. Radiology:   History:   Right hand pain    Comparison:   5/27/20    Findings:   3 xray views of the right hand including AP, lateral, and oblique demonstrate no acute fractures or dislocations. Pan-trapezial arthritis is noted. Impression:  Right hand 1st cmc joint degenerative arthritis    Assessment:      1. Right hand pain           Plan:      -Reviewed xray imaging of right hand in office with patient  -Discussed possible extensor tendinitis over her right 5th metacarpal which could be related to her RA. Also discussed possible extensor tendon rupture but given her ability to still extend her wrist/digits would be lower on our differential  -Discussed trialing a medrol dosepak  -If no relief following dosepak discussed further imaging such as MRI right hand. Patient in agreement with plan and displayed understanding following our discussion of diagnosis and treatment recommendations  -Follow up in 2 weeks for re-evaluation of right hand    Follow up:Return in about 2 weeks (around 3/15/2023) for right hand follow up. Orders Placed This Encounter   Medications    methylPREDNISolone (MEDROL DOSEPACK) 4 MG tablet     Sig: Take by mouth.      Dispense:  1 kit     Refill:  0          Orders Placed This Encounter   Procedures    XR HAND RIGHT (MIN 3 VIEWS)     Standing Status:   Future     Number of Occurrences:   1     Standing Expiration Date:   3/1/2024     Order Specific Question:   Reason for exam:     Answer:   Pain over the dorsum of right 5th metacarpal       Nicole Snyder Amanda Young DO  Orthopedic Surgery Resident, PGY-4  [facility]

## 2023-03-17 ENCOUNTER — TELEPHONE (OUTPATIENT)
Dept: ORTHOPEDIC SURGERY | Age: 73
End: 2023-03-17

## 2023-03-17 DIAGNOSIS — M79.641 RIGHT HAND PAIN: Primary | ICD-10-CM

## 2023-03-17 NOTE — TELEPHONE ENCOUNTER
Patient states that she tried the steroid pack and it upset her stomach. Requesting MRI.  Please advise

## 2023-03-17 NOTE — TELEPHONE ENCOUNTER
Pt called in is asking if we can order an MRI of her rt hand. Has an upcoming appt on 03/22/23 was hoping to get one done prior to that appt or if we had to get MRI schd she could kraig that appt. States is not able to sleep at night has something protruding out on her rt hand either a bone or a tendon.     Please call pt back if we are able to get an MRI order prior to appt @ [phone]

## 2023-03-20 NOTE — TELEPHONE ENCOUNTER
Pt called stating she got a call from [facility] but no message was left. She again is hoping to get scheduled for an MRI. She states her hand is painful and has to wrap it in order to get any sleep. Please call pt to discuss MRI.

## 2023-03-20 NOTE — TELEPHONE ENCOUNTER
Pt called again stating she would like to find out if Doctor would order MRI and reschedule office visit until after the test.  Her visit is currently scheduled on 3/22/23.

## 2023-03-20 NOTE — TELEPHONE ENCOUNTER
Spoke resident on call Dr. Balaji Contreras and gayathri to order mri, called patient to notify her.  No answer left [facility]

## 2023-03-21 DIAGNOSIS — M79.641 RIGHT HAND PAIN: Primary | ICD-10-CM

## 2023-03-28 ENCOUNTER — HOSPITAL ENCOUNTER (OUTPATIENT)
Dept: MRI IMAGING | Age: 73
Discharge: HOME | End: 2023-03-30
Payer: COMMERCIAL

## 2023-03-28 DIAGNOSIS — M79.641 RIGHT HAND PAIN: ICD-10-CM

## 2023-03-28 PROCEDURE — 73218 MRI UPPER EXTREMITY W/O DYE: CPT

## 2023-04-05 ENCOUNTER — OFFICE VISIT (OUTPATIENT)
Dept: ORTHOPEDIC SURGERY | Age: 73
End: 2023-04-05

## 2023-04-05 VITALS — WEIGHT: 121 LBS | BODY MASS INDEX: 17.32 KG/M2 | HEIGHT: 70 IN

## 2023-04-05 DIAGNOSIS — M05.7A RHEUMATOID ARTHRITIS OF OTHER SITE WITH POSITIVE RHEUMATOID FACTOR (HCC): ICD-10-CM

## 2023-04-05 DIAGNOSIS — M65.849 EXTENSOR TENOSYNOVITIS OF FINGER: Primary | ICD-10-CM

## 2023-04-05 NOTE — PROGRESS NOTES
[address]  [address] [facility] [address]  Dept: [phone]  Dept Fax: [phone]        Orthopaedic Clinic Follow Up      Subjective:     Lelo Murphy is a 68y.o. year old female who presents to the clinic today for routine followup regarding her right wrist pain. Patient is here today for review of her MRI as there was suspicion for possible extensor tendon rupture in lieu of her history of rheumatoid arthritis at the right fifth digit. Denies any new changes in symptoms. Patient still has a nodule along the dorsal aspect of the fifth metacarpal.  Otherwise no other complaints. Review of Systems  Gen: no fever, chills, malaise  CV: no chest pain or palpitations  Resp: no cough or shortness of breath  GI: no nausea, vomiting, diarrhea, or constipation  Neuro: no numbness, tingling, or weakness  Msk: Described in HPI  10 remaining systems reviewed and negative    Objective : There were no vitals filed for this visit. Body mass index is 17.36 kg/m². General: No acute distress, resting comfortably in the clinic  Neuro: alert. oriented  Eyes: Extra-ocular muscles intact  Pulm: Respirations unlabored and regular. Skin: warm, well perfused  Psych:   Patient has good fund of knowledge and displays understanging of exam, diagnosis, and plan. MSK: Mildly tender nodule noted at the dorsal aspect of the fifth metacarpal along the extensor digit he minimi. Extensor function is intact without extensor lag. [place]-neck deformity of bilateral fifth digits. Sensations intact grossly to the hand. Skin is warm and well-perfused. Radiology:  None taken in clinic     Assessment:   68y.o. year old female with left fifth extensor tendon tenosynovitis right hand  Plan:      Patient is here today for follow-up of the aforementioned pathology. MRI was reviewed in length with the patient today.   No extensor rupture is demonstrated

## 2023-04-07 ENCOUNTER — TELEPHONE (OUTPATIENT)
Dept: ORTHOPEDIC SURGERY | Age: 73
End: 2023-04-07

## 2023-04-07 NOTE — TELEPHONE ENCOUNTER
Patient called to ask if Diclofenac Gel Can be sent to   [facility]. Patient  states the over the counter is to costly and her insurance will cover the RX. Patient can be called back [phone].

## 2023-08-30 ENCOUNTER — OFFICE VISIT (OUTPATIENT)
Dept: ORTHOPEDIC SURGERY | Age: 73
End: 2023-08-30

## 2023-08-30 VITALS — WEIGHT: 121 LBS | BODY MASS INDEX: 17.32 KG/M2 | HEIGHT: 70 IN

## 2023-08-30 DIAGNOSIS — M67.40 GANGLION CYST: Primary | ICD-10-CM

## 2024-11-12 ENCOUNTER — TELEPHONE (OUTPATIENT)
Dept: NEUROLOGY | Age: 74
End: 2024-11-12

## 2024-11-12 NOTE — TELEPHONE ENCOUNTER
11 12 2024 I called times 2 to schedule new patient appointment (11 04 2024 and 11 12 2024 at ), the patient was not available both times, no response, I mailed a letter asking the patient to call the office to schedule this appointment.  KS

## 2024-11-19 ENCOUNTER — TELEPHONE (OUTPATIENT)
Dept: ORTHOPEDIC SURGERY | Age: 74
End: 2024-11-19

## 2024-11-19 NOTE — TELEPHONE ENCOUNTER
Attempted to reach out to inform pt xrays will be performed in office at time of visit. Both lines listed and unavailable

## 2024-11-19 NOTE — TELEPHONE ENCOUNTER
Patient wanting to see if she can get order for xray of left knee so she can have xray completed before next appointment  Please advise  Thank you

## 2024-11-20 ENCOUNTER — OFFICE VISIT (OUTPATIENT)
Dept: ORTHOPEDIC SURGERY | Age: 74
End: 2024-11-20
Payer: MEDICARE

## 2024-11-20 VITALS — HEIGHT: 70 IN | WEIGHT: 121.4 LBS | BODY MASS INDEX: 17.38 KG/M2

## 2024-11-20 DIAGNOSIS — M25.562 LEFT KNEE PAIN, UNSPECIFIED CHRONICITY: Primary | ICD-10-CM

## 2024-11-20 PROCEDURE — 99213 OFFICE O/P EST LOW 20 MIN: CPT | Performed by: STUDENT IN AN ORGANIZED HEALTH CARE EDUCATION/TRAINING PROGRAM

## 2024-11-20 PROCEDURE — 1125F AMNT PAIN NOTED PAIN PRSNT: CPT | Performed by: STUDENT IN AN ORGANIZED HEALTH CARE EDUCATION/TRAINING PROGRAM

## 2024-11-20 PROCEDURE — 1123F ACP DISCUSS/DSCN MKR DOCD: CPT | Performed by: STUDENT IN AN ORGANIZED HEALTH CARE EDUCATION/TRAINING PROGRAM

## 2024-11-20 PROCEDURE — 1159F MED LIST DOCD IN RCRD: CPT | Performed by: STUDENT IN AN ORGANIZED HEALTH CARE EDUCATION/TRAINING PROGRAM

## 2024-11-20 RX ORDER — MELOXICAM 7.5 MG/1
7.5 TABLET ORAL DAILY
Qty: 30 TABLET | Refills: 3 | Status: SHIPPED | OUTPATIENT
Start: 2024-11-20

## 2024-11-20 NOTE — PROGRESS NOTES
[facility] ORTHO SPECIALISTS  [address]  [place] [address]  Dept: [phone]  Dept Fax: [phone]        Ambulatory Follow Up    Subjective:     HPI:    Katherine Bronson is a 74 y.o. year old female who presents to our office today regarding left knee pain that occurs with stairs.  She states she has had mild left knee pain for a few years now and has previously had an injection approximately 2 years ago but today she would like to refrain from an injection.  Denies any injury, fall to the left knee.  No catching, clicking of the left knee.  Denies any numbness, tingling.  Ambulates without assistive devices.        Review of Systems:  Constitutional: Negative for fever and chills.   HENT: Negative for congestion.    Eyes: Negative for blurred vision and double vision.   Respiratory: Negative for cough, shortness of breath and wheezing.    Cardiovascular: Negative for chest pain and palpitations.   Gastrointestinal: Negative for nausea. Negative for vomiting.   Musculoskeletal: Positive for (left knee pain).   Skin: Negative for itching and rash.   Neurological: Negative for dizziness, sensory change and headaches.   Psychiatric/Behavioral: Negative for depression and suicidal ideas.       Objective :   General: AAOx3, NAD, appears stated age  CV: no obvious JVD, distal pulses 2+  Respiratory: chest rise symmetric, unlabored respirations, no audible wheezing  Skin: warm, well perfused, no obvious rashes or lesions  Psych: Patient displays understanding of exam, diagnosis, and plan.    MSK-LLE: Skin intact.  No significant effusion, edema seen to the knee today.  Full active range of motion of the left knee.  Able to get up from chair without any issue.  Nonantalgic gait.  Very minimal J sign seen.  Knee stable to varus and valgus stress, Lachman Ia.  No pain with Maximiliano's.  Able to straight leg raise.  Very minimal tenderness to palpation along

## 2025-02-13 ENCOUNTER — OFFICE VISIT (OUTPATIENT)
Dept: NEUROLOGY | Age: 75
End: 2025-02-13
Payer: MEDICARE

## 2025-02-13 VITALS
DIASTOLIC BLOOD PRESSURE: 76 MMHG | HEART RATE: 65 BPM | SYSTOLIC BLOOD PRESSURE: 156 MMHG | WEIGHT: 120 LBS | HEIGHT: 70 IN | BODY MASS INDEX: 17.18 KG/M2

## 2025-02-13 DIAGNOSIS — G62.9 PERIPHERAL POLYNEUROPATHY: Primary | ICD-10-CM

## 2025-02-13 DIAGNOSIS — R20.2 TINGLING OF BOTH FEET: ICD-10-CM

## 2025-02-13 PROCEDURE — 1123F ACP DISCUSS/DSCN MKR DOCD: CPT | Performed by: STUDENT IN AN ORGANIZED HEALTH CARE EDUCATION/TRAINING PROGRAM

## 2025-02-13 PROCEDURE — 1159F MED LIST DOCD IN RCRD: CPT | Performed by: STUDENT IN AN ORGANIZED HEALTH CARE EDUCATION/TRAINING PROGRAM

## 2025-02-13 PROCEDURE — 99205 OFFICE O/P NEW HI 60 MIN: CPT | Performed by: STUDENT IN AN ORGANIZED HEALTH CARE EDUCATION/TRAINING PROGRAM

## 2025-02-19 ENCOUNTER — OFFICE VISIT (OUTPATIENT)
Dept: ORTHOPEDIC SURGERY | Age: 75
End: 2025-02-19
Payer: MEDICARE

## 2025-02-19 VITALS — WEIGHT: 120 LBS | BODY MASS INDEX: 17.18 KG/M2 | HEIGHT: 70 IN

## 2025-02-19 DIAGNOSIS — M79.641 PAIN OF RIGHT HAND: Primary | ICD-10-CM

## 2025-02-19 PROCEDURE — 1126F AMNT PAIN NOTED NONE PRSNT: CPT | Performed by: STUDENT IN AN ORGANIZED HEALTH CARE EDUCATION/TRAINING PROGRAM

## 2025-02-19 PROCEDURE — 99213 OFFICE O/P EST LOW 20 MIN: CPT | Performed by: STUDENT IN AN ORGANIZED HEALTH CARE EDUCATION/TRAINING PROGRAM

## 2025-02-19 PROCEDURE — 1123F ACP DISCUSS/DSCN MKR DOCD: CPT | Performed by: STUDENT IN AN ORGANIZED HEALTH CARE EDUCATION/TRAINING PROGRAM

## 2025-02-19 PROCEDURE — 1159F MED LIST DOCD IN RCRD: CPT | Performed by: STUDENT IN AN ORGANIZED HEALTH CARE EDUCATION/TRAINING PROGRAM

## 2025-02-22 NOTE — PROGRESS NOTES
History    Not on file   Tobacco Use    Smoking status: Former     Current packs/day: 0.00     Average packs/day: 0.2 packs/day for 12.0 years (2.4 ttl pk-yrs)     Types: Cigarettes     Start date: 1975     Quit date: 1987     Years since quittin.5    Smokeless tobacco: Never   Substance and Sexual Activity    Alcohol use: Yes     Comment: rare    Drug use: No    Sexual activity: Not on file   Other Topics Concern    Not on file   Social History Narrative    Not on file     Social Determinants of Health     Financial Resource Strain: Not on file   Food Insecurity: No Food Insecurity (2024)    Received from [facility]    Hunger Screening     Within the past 12 months we worried whether our food would run out before we got money to buy more.: Never True     Within the past 12 months the food we bought just didn't last and we didn't have money to get more.: Never True   Transportation Needs: Not on file   Physical Activity: Not on file   Stress: Not on file   Social Connections: Not on file   Intimate Partner Violence: Unknown (2024)    Received from [facility]     Fear of Current or Ex-Partner: Not on file     Emotionally Abused: Not on file     Physically Abused: Not on file     Sexually Abused: Not on file     Physically or Sexually Abused: Not on file   Housing Stability: Not on file       Family History:  History reviewed. No pertinent family history.      Reviewed Subjective section with patient who did agree and confirmed everything documented. Discussed plan and patient expressed understanding of diagnosis andprognosis with plan as stated.  All questions answered.     Aristeo Olea MD  Orthopedic Surgery Resident, PGY-3  [place], [place]

## 2025-03-14 ENCOUNTER — HOSPITAL ENCOUNTER (OUTPATIENT)
Age: 75
Setting detail: SPECIMEN
Discharge: HOME | End: 2025-03-14

## 2025-03-14 DIAGNOSIS — G62.9 PERIPHERAL POLYNEUROPATHY: ICD-10-CM

## 2025-03-14 LAB
ALBUMIN PERCENT: NORMAL %
ALBUMIN SERPL-MCNC: 4.2 G/DL (ref 3.5–5.2)
ALBUMIN SERPL-MCNC: NORMAL G/DL
ALBUMIN/GLOB SERPL: 1.6 {RATIO} (ref 1–2.5)
ALP SERPL-CCNC: 94 U/L (ref 35–104)
ALPHA 2 PERCENT: NORMAL %
ALPHA1 GLOB SERPL ELPH-MCNC: NORMAL %
ALPHA1 GLOB SERPL ELPH-MCNC: NORMAL G/DL
ALPHA2 GLOB SERPL ELPH-MCNC: NORMAL G/DL
ALT SERPL-CCNC: 18 U/L (ref 10–35)
ANION GAP SERPL CALCULATED.3IONS-SCNC: 8 MMOL/L (ref 9–16)
AST SERPL-CCNC: 27 U/L (ref 10–35)
B-GLOBULIN SERPL ELPH-MCNC: NORMAL %
B-GLOBULIN SERPL ELPH-MCNC: NORMAL G/DL
BASOPHILS # BLD: 0.03 K/UL (ref 0–0.2)
BASOPHILS NFR BLD: 1 % (ref 0–2)
BILIRUB SERPL-MCNC: 0.4 MG/DL (ref 0–1.2)
BUN SERPL-MCNC: 16 MG/DL (ref 8–23)
CALCIUM SERPL-MCNC: 9 MG/DL (ref 8.6–10.4)
CHLORIDE SERPL-SCNC: 103 MMOL/L (ref 98–107)
CO2 SERPL-SCNC: 29 MMOL/L (ref 20–31)
CREAT SERPL-MCNC: 0.6 MG/DL (ref 0.6–0.9)
CRP SERPL HS-MCNC: <3 MG/L (ref 0–5)
EOSINOPHIL # BLD: 0.03 K/UL (ref 0–0.44)
EOSINOPHILS RELATIVE PERCENT: 1 % (ref 1–4)
ERYTHROCYTE [DISTWIDTH] IN BLOOD BY AUTOMATED COUNT: 13.1 % (ref 11.8–14.4)
ERYTHROCYTE [SEDIMENTATION RATE] IN BLOOD BY PHOTOMETRIC METHOD: 11 MM/HR (ref 0–30)
GAMMA GLOB SERPL ELPH-MCNC: NORMAL G/DL
GAMMA GLOBULIN %: NORMAL %
GFR, ESTIMATED: >90 ML/MIN/1.73M2
GLUCOSE SERPL-MCNC: 91 MG/DL (ref 74–99)
HCT VFR BLD AUTO: 38.6 % (ref 36.3–47.1)
HGB BLD-MCNC: 12.2 G/DL (ref 11.9–15.1)
IMM GRANULOCYTES # BLD AUTO: 0 K/UL (ref 0–0.3)
IMM GRANULOCYTES NFR BLD: 0 %
LYMPHOCYTES NFR BLD: 1.19 K/UL (ref 1.1–3.7)
LYMPHOCYTES RELATIVE PERCENT: 48 % (ref 24–43)
M PROTEIN 2 SERPL ELPH-MCNC: NORMAL G/DL
M PROTEIN SERPL ELPH-MCNC: NORMAL G/DL
MCH RBC QN AUTO: 29.2 PG (ref 25.2–33.5)
MCHC RBC AUTO-ENTMCNC: 31.6 G/DL (ref 28.4–34.8)
MCV RBC AUTO: 92.3 FL (ref 82.6–102.9)
MONOCYTES NFR BLD: 0.35 K/UL (ref 0.1–1.2)
MONOCYTES NFR BLD: 14 % (ref 3–12)
MORPHOLOGY: NORMAL
NEUTROPHILS NFR BLD: 36 % (ref 36–65)
NEUTS SEG NFR BLD: 0.9 K/UL (ref 1.5–8.1)
NRBC BLD-RTO: 0 PER 100 WBC
PATHOLOGIST: NORMAL
PLATELET # BLD AUTO: 157 K/UL (ref 138–453)
PMV BLD AUTO: 10.8 FL (ref 8.1–13.5)
POTASSIUM SERPL-SCNC: 3.9 MMOL/L (ref 3.7–5.3)
PROT PATTERN SERPL ELPH-IMP: NORMAL
PROT SERPL-MCNC: 6.7 G/DL (ref 6.6–8.7)
PROT SERPL-MCNC: 6.8 G/DL (ref 6.6–8.7)
RBC # BLD AUTO: 4.18 M/UL (ref 3.95–5.11)
SODIUM SERPL-SCNC: 140 MMOL/L (ref 136–145)
TOTAL PROT. SUM,%: NORMAL %
TOTAL PROT. SUM: NORMAL G/DL
WBC OTHER # BLD: 2.5 K/UL (ref 3.5–11.3)

## 2025-03-16 LAB
COPPER SERPL-MCNC: 111.2 UG/DL (ref 80–155)
ZINC SERPL-MCNC: 77.5 UG/DL (ref 60–120)

## 2025-03-18 LAB
ALBUMIN PERCENT: 60 % (ref 56–66)
ALBUMIN SERPL-MCNC: 4 G/DL (ref 3.2–5.2)
ALPHA 2 PERCENT: 9 % (ref 7–12)
ALPHA1 GLOB SERPL ELPH-MCNC: 0.3 G/DL (ref 0.1–0.4)
ALPHA1 GLOB SERPL ELPH-MCNC: 4 % (ref 3–5)
ALPHA2 GLOB SERPL ELPH-MCNC: 0.6 G/DL (ref 0.5–0.9)
B-GLOBULIN SERPL ELPH-MCNC: 0.8 G/DL (ref 0.7–1.4)
B-GLOBULIN SERPL ELPH-MCNC: 13 % (ref 8–13)
GAMMA GLOB SERPL ELPH-MCNC: 1 G/DL (ref 0.5–1.5)
GAMMA GLOBULIN %: 15 % (ref 11–19)
PATHOLOGIST: NORMAL
PROT PATTERN SERPL ELPH-IMP: NORMAL
PROT SERPL-MCNC: 6.7 G/DL (ref 6.6–8.7)
TOTAL PROT. SUM,%: 101 % (ref 98–102)
TOTAL PROT. SUM: 6.7 G/DL (ref 6.3–8.2)

## 2025-03-28 ENCOUNTER — TELEPHONE (OUTPATIENT)
Dept: NEUROLOGY | Age: 75
End: 2025-03-28

## 2025-03-28 NOTE — TELEPHONE ENCOUNTER
03 28 2025 called the patient times 2 (03 11 2025 and 03 18 2025 at )  to schedule follow up appointment with Dr. Garcia, left message on machine both times, no response.  I mailed the patient a letter asking them to call the office back to schedule this appointment.  KS

## 2025-04-09 ENCOUNTER — APPOINTMENT (OUTPATIENT)
Dept: GENERAL RADIOLOGY | Age: 75
End: 2025-04-09
Payer: MEDICARE

## 2025-04-09 ENCOUNTER — HOSPITAL ENCOUNTER (EMERGENCY)
Age: 75
Discharge: HOME | End: 2025-04-09
Attending: EMERGENCY MEDICINE
Payer: MEDICARE

## 2025-04-09 VITALS
RESPIRATION RATE: 16 BRPM | HEIGHT: 70 IN | TEMPERATURE: 97.2 F | WEIGHT: 122 LBS | OXYGEN SATURATION: 97 % | SYSTOLIC BLOOD PRESSURE: 158 MMHG | DIASTOLIC BLOOD PRESSURE: 77 MMHG | BODY MASS INDEX: 17.47 KG/M2 | HEART RATE: 80 BPM

## 2025-04-09 DIAGNOSIS — R07.89 CHEST WALL PAIN: Primary | ICD-10-CM

## 2025-04-09 LAB
ALBUMIN SERPL-MCNC: 3.7 G/DL (ref 3.5–5.2)
ALBUMIN/GLOB SERPL: 1.5 {RATIO} (ref 1–2.5)
ALP SERPL-CCNC: 84 U/L (ref 35–104)
ALT SERPL-CCNC: 14 U/L (ref 10–35)
ANION GAP SERPL CALCULATED.3IONS-SCNC: 8 MMOL/L (ref 9–16)
AST SERPL-CCNC: 25 U/L (ref 10–35)
BASOPHILS # BLD: <0.03 K/UL (ref 0–0.2)
BASOPHILS NFR BLD: 0 % (ref 0–2)
BILIRUB SERPL-MCNC: 0.4 MG/DL (ref 0–1.2)
BUN SERPL-MCNC: 19 MG/DL (ref 8–23)
CALCIUM SERPL-MCNC: 8.8 MG/DL (ref 8.8–10.2)
CHLORIDE SERPL-SCNC: 105 MMOL/L (ref 98–107)
CO2 SERPL-SCNC: 26 MMOL/L (ref 20–31)
CREAT SERPL-MCNC: 0.5 MG/DL (ref 0.5–0.9)
EKG ATRIAL RATE: 86 BPM
EKG P AXIS: 82 DEGREES
EKG P-R INTERVAL: 132 MS
EKG Q-T INTERVAL: 380 MS
EKG QRS DURATION: 96 MS
EKG QTC CALCULATION (BAZETT): 454 MS
EKG R AXIS: -2 DEGREES
EKG T AXIS: 59 DEGREES
EKG VENTRICULAR RATE: 86 BPM
EOSINOPHIL # BLD: <0.03 K/UL (ref 0–0.44)
EOSINOPHILS RELATIVE PERCENT: 0 % (ref 1–4)
ERYTHROCYTE [DISTWIDTH] IN BLOOD BY AUTOMATED COUNT: 13.1 % (ref 11.8–14.4)
GFR, ESTIMATED: >90 ML/MIN/1.73M2
GLUCOSE SERPL-MCNC: 84 MG/DL (ref 82–115)
HCT VFR BLD AUTO: 36.9 % (ref 36.3–47.1)
HGB BLD-MCNC: 11.8 G/DL (ref 11.9–15.1)
IMM GRANULOCYTES # BLD AUTO: 0.01 K/UL (ref 0–0.3)
IMM GRANULOCYTES NFR BLD: 0 %
INR PPP: 1
LIPASE SERPL-CCNC: 32 U/L (ref 13–60)
LYMPHOCYTES NFR BLD: 0.96 K/UL (ref 1.1–3.7)
LYMPHOCYTES RELATIVE PERCENT: 33 % (ref 24–43)
MAGNESIUM SERPL-MCNC: 1.9 MG/DL (ref 1.6–2.4)
MCH RBC QN AUTO: 30.2 PG (ref 25.2–33.5)
MCHC RBC AUTO-ENTMCNC: 32 G/DL (ref 28.4–34.8)
MCV RBC AUTO: 94.4 FL (ref 82.6–102.9)
MONOCYTES NFR BLD: 0.3 K/UL (ref 0.1–1.2)
MONOCYTES NFR BLD: 10 % (ref 3–12)
NEUTROPHILS NFR BLD: 57 % (ref 36–65)
NEUTS SEG NFR BLD: 1.64 K/UL (ref 1.5–8.1)
NRBC BLD-RTO: 0 PER 100 WBC
PLATELET # BLD AUTO: 148 K/UL (ref 138–453)
PMV BLD AUTO: 10.2 FL (ref 8.1–13.5)
POTASSIUM SERPL-SCNC: 3.9 MMOL/L (ref 3.7–5.3)
PROT SERPL-MCNC: 6.2 G/DL (ref 6.6–8.7)
PROTHROMBIN TIME: 13.8 SEC (ref 11.5–14.2)
RBC # BLD AUTO: 3.91 M/UL (ref 3.95–5.11)
SODIUM SERPL-SCNC: 139 MMOL/L (ref 136–145)
TROPONIN I SERPL HS-MCNC: <6 NG/L (ref 0–14)
TROPONIN I SERPL HS-MCNC: <6 NG/L (ref 0–14)
WBC OTHER # BLD: 2.9 K/UL (ref 3.5–11.3)

## 2025-04-09 PROCEDURE — 84484 ASSAY OF TROPONIN QUANT: CPT

## 2025-04-09 PROCEDURE — 71045 X-RAY EXAM CHEST 1 VIEW: CPT

## 2025-04-09 PROCEDURE — 85025 COMPLETE CBC W/AUTO DIFF WBC: CPT

## 2025-04-09 PROCEDURE — 99285 EMERGENCY DEPT VISIT HI MDM: CPT

## 2025-04-09 PROCEDURE — 85610 PROTHROMBIN TIME: CPT

## 2025-04-09 PROCEDURE — 83735 ASSAY OF MAGNESIUM: CPT

## 2025-04-09 PROCEDURE — 80053 COMPREHEN METABOLIC PANEL: CPT

## 2025-04-09 PROCEDURE — 93005 ELECTROCARDIOGRAM TRACING: CPT | Performed by: EMERGENCY MEDICINE

## 2025-04-09 PROCEDURE — 83690 ASSAY OF LIPASE: CPT

## 2025-04-09 ASSESSMENT — HEART SCORE: ECG: NON-SPECIFC REPOLARIZATION DISTURBANCE/LBTB/PM

## 2025-04-09 ASSESSMENT — PAIN - FUNCTIONAL ASSESSMENT: PAIN_FUNCTIONAL_ASSESSMENT: 0-10

## 2025-04-09 NOTE — ED PROVIDER NOTES
EMERGENCY DEPARTMENT ENCOUNTER    Pt Name: Katherine Bronson  MRN: [id number]  Birthdate 1950  Date of evaluation: 4/9/25  CHIEF COMPLAINT       Chief Complaint   Patient presents with    Chest Pain     HISTORY OF PRESENT ILLNESS   HPI  Feels an intermittent pinching sensation in the left ribs, intermittent, for months, especially when she reaches up with her left arm.  No cough.  No diaphoresis.  No dyspnea.  No exertional symptoms.  No back pain or central chest pain.  No abdominal pain nausea vomiting.      REVIEW OF SYSTEMS     Review of Systems   All other systems reviewed and are negative.    PASTMEDICAL HISTORY     Past Medical History:   Diagnosis Date    Anemia     Bilateral knee pain     Ebsteins anomaly     Hypothyroidism     Osteoarthritis     DJD bilateral knee    PID (pelvic inflammatory disease)      Past Problem List  Patient Active Problem List   Diagnosis Code    DJD (degenerative joint disease) of knee M17.9    Knee effusion, right M25.461    Rheumatoid arthritis (HCC) M06.9    Bilateral knee pain M25.561, M25.562     SURGICAL HISTORY       Past Surgical History:   Procedure Laterality Date    HYSTERECTOMY (CERVIX STATUS UNKNOWN)      THYROIDECTOMY, PARTIAL      TONSILLECTOMY AND ADENOIDECTOMY       CURRENT MEDICATIONS       Previous Medications    CALCIUM CARBONATE-VITAMIN D (OYSTER SHELL CALCIUM/D) 500-200 MG-UNIT TABS        DICLOFENAC (VOLTAREN) 50 MG EC TABLET    Take 1 tablet by mouth 2 times daily    DICLOFENAC (VOLTAREN) 50 MG EC TABLET    Take 1 tablet by mouth 3 times daily (with meals)    DICLOFENAC SODIUM (VOLTAREN) 1 % GEL    Apply 2 g topically 4 times daily    GABAPENTIN (NEURONTIN) 300 MG CAPSULE    Take 1 capsule by mouth 3 times daily.    IBUPROFEN (ADVIL;MOTRIN) 800 MG TABLET    Take 1 tablet by mouth 3 times daily as needed for Pain    LEVOTHYROXINE (SYNTHROID) 75 MCG TABLET    Take 1 tablet by mouth Daily    MELOXICAM (MOBIC) 15 MG TABLET    Take 1 tablet by mouth daily

## 2025-04-09 NOTE — ED NOTES
Pt reports left sided chest pain that has been going on since the last few days. Pt reports she attempted to walk up and down the stairs ten times to see if that improves the pain since she was reading an article that said it might work. PT reports the pain occurs around 3 times every day.

## 2025-04-22 NOTE — PROGRESS NOTES
tablet Take 1 tablet by mouth 3 times daily (with meals) 5/19/22  Yes Kalin Gutierrez DO   meloxicam (MOBIC) 15 MG tablet Take 1 tablet by mouth daily 11/4/19  Yes Jose Miguel Riojas DO   diclofenac (VOLTAREN) 50 MG EC tablet Take 1 tablet by mouth 2 times daily 6/10/19  Yes Marquez Billy DO   Multiple Vitamin (DAILY SLADE) TABS  6/9/15  Yes Zachery Crenshaw MD   vitamin B-12 (CYANOCOBALAMIN) 100 MCG tablet Take 0.5 tablets by mouth daily   Yes Zachery Crenshaw MD   Calcium Carbonate-Vitamin D (OYSTER SHELL CALCIUM/D) 500-200 MG-UNIT TABS  12/3/14  Yes Zachery Crenshaw MD   gabapentin (NEURONTIN) 300 MG capsule Take 1 capsule by mouth 3 times daily.   Yes Zachery Crenshaw MD   levothyroxine (SYNTHROID) 75 MCG tablet Take 1 tablet by mouth Daily   Yes Zachery Crenshaw MD   ibuprofen (ADVIL;MOTRIN) 800 MG tablet Take 1 tablet by mouth 3 times daily as needed for Pain 3/24/22 4/7/22  Chris Manriquez DO       ALLERGIES: No Known Allergies      ROS:    The patient did not report additional concerns. There were no symptoms suggestive of cardiac, gastrointestinal, or endocrinal dysfunction.   No abnormal skin findings.  No complaints of headaches or visual disturbances.   No symptoms suggestive of respiratory, genitourinary dysfunction.  No concerns of emotional disturbances.        Physical Exam:  BP (!) 156/76 (Site: Right Upper Arm, Position: Sitting, Cuff Size: Medium Adult)   Pulse 65   Ht 1.778 m (5' 10\")   Wt 54.4 kg (120 lb)   BMI 17.22 kg/m²   General Appearance: In no acute distress  Head: \"Normocephalic, without obvious abnormality\",\"atraumatic\"    Mental Status: Orientation oriented to person, place, problem, and time.   Mood/Affect: appropriate mood and affect.  Attention Span/Concentration: three part command intact.   Language: can appropriately name objects and has spontaneous speech.  Memory: recent memory intact and remote memory intact.   Fund of Knowledge: current events 
Statement Selected

## 2025-06-26 ENCOUNTER — HOSPITAL ENCOUNTER (OUTPATIENT)
Dept: PHYSICAL THERAPY | Facility: CLINIC | Age: 75
Setting detail: THERAPIES SERIES
Discharge: HOME | End: 2025-06-26
Payer: MEDICARE

## 2025-06-26 PROCEDURE — 97161 PT EVAL LOW COMPLEX 20 MIN: CPT

## 2025-06-26 PROCEDURE — 97110 THERAPEUTIC EXERCISES: CPT

## 2025-06-26 NOTE — CONSULTS
[] [facility]  Outpatient Rehabilitation &  Therapy  [address]  [phone]  [phone] [] [facility]  Outpatient Rehabilitation &  Therapy  [address]  P: [phone]  F: [phone] [] [facility]  Outpatient Rehabilitation &  Therapy  [address]  P: [phone]  F: [phone] [x] [facility]  Outpatient Rehabilitation &  Therapy  [address]  [phone]  [phone] [] [facility]  Outpatient Rehabilitation &  Therapy  [address]   P: [phone]  F: [phone]  [] [facility]  Outpatient Rehabilitation &  Therapy  [address]  P: [phone]  F: [phone] [] [facility]  Outpatient Rehabilitation &  Therapy  [address]  P: [phone]  F: [phone] [] [facility]  Outpatient Rehabilitation &  Therapy  [address]  P: [phone]  F: [phone] [] [facility]  Outpatient Rehabilitation &  Therapy  [address]  P: [phone]  F: [phone]  [] [facility] Outpatient Rehabilitation &  Therapy  [address]  P: [phone]  F: [phone]     Physical Therapy General Evaluation    Date:  2025  Patient: Katherine Bronson  : 1950  MRN: [id number]  Physician: Rivera Gonzalez MD     Insurance: 1.BCBS Medicare; Jeremy yr; vs based on med nec; auth after eval; no pt resp 2.[employer]; Jeremy yr; follows primary; no pt resp   Medical Diagnosis: Rheumatoid arthritis, adult (HCC) [M06.9     Rehab Codes: G89.2   Onset Date: 2010                                  Next 's appt: TBD    Subjective:   CC: Chronic back pain/RA     HPI: Pt states RA diagnosis 20+ years, onset of thoracic scoliosis 10+ years. Pt states pain is constant but she has had several cortisone

## 2025-07-14 ENCOUNTER — HOSPITAL ENCOUNTER (OUTPATIENT)
Dept: PHYSICAL THERAPY | Facility: CLINIC | Age: 75
Setting detail: THERAPIES SERIES
Discharge: HOME | End: 2025-07-14

## 2025-07-14 NOTE — FLOWSHEET NOTE
[] [facility]  Outpatient Rehabilitation &  Therapy  [address]  [phone]  [phone] [] [facility]  Outpatient Rehabilitation &  Therapy  [address]  P: [phone]  F: [phone] [] [facility]  Outpatient Rehabilitation &  Therapy  [address]  P: [phone]  F: [phone] [x] [facility]  Outpatient Rehabilitation &  Therapy  [address]  [phone]  [phone] [] [facility]  Outpatient Rehabilitation &  Therapy  [address]   P: [phone]  F: [phone]  [] [facility]  Outpatient Rehabilitation &  Therapy  [address]  P: [phone]  F: [phone] [] [facility]  Outpatient Rehabilitation &  Therapy  [address]  P: [phone]  F: [phone] [] [facility]  Outpatient Rehabilitation &  Therapy  [address]  P: [phone]  F: [phone] [] [facility]  Outpatient Rehabilitation &  Therapy  [address]  P: [phone]  F: [phone]  [] [facility] Outpatient Rehabilitation &  Therapy  [address]  P: [phone]  F: [phone] [] [facility] Outpatient Rehabilitation &  Therapy  [address]  P: [phone]   F: [phone]      Therapy Cancel/No Show note    Date: 2025  Patient: Katherine Bronson  : 1950  MRN: [id number]    Cancels/No Shows to date:     For today's appointment patient:    [x]  Cancelled    [] Rescheduled appointment    [] No-show     Reason given by patient:    []  Patient ill    []  Conflicting appointment    [] No transportation      [] Conflict with work    [] No reason given    [] Weather related    [] COVID-19    [x] Other:      Comments:  Per : pt thought she was supposed to do

## 2025-08-01 ENCOUNTER — OFFICE VISIT (OUTPATIENT)
Age: 75
End: 2025-08-01

## 2025-08-01 VITALS
TEMPERATURE: 98.3 F | HEART RATE: 79 BPM | BODY MASS INDEX: 17.47 KG/M2 | WEIGHT: 122 LBS | OXYGEN SATURATION: 94 % | DIASTOLIC BLOOD PRESSURE: 69 MMHG | HEIGHT: 70 IN | SYSTOLIC BLOOD PRESSURE: 120 MMHG | RESPIRATION RATE: 16 BRPM

## 2025-08-01 DIAGNOSIS — S39.012A LUMBAR STRAIN, INITIAL ENCOUNTER: Primary | ICD-10-CM

## 2025-08-01 RX ORDER — KETOROLAC TROMETHAMINE 30 MG/ML
30 INJECTION, SOLUTION INTRAMUSCULAR; INTRAVENOUS ONCE
Status: COMPLETED | OUTPATIENT
Start: 2025-08-01 | End: 2025-08-01

## 2025-08-01 RX ADMIN — KETOROLAC TROMETHAMINE 30 MG: 30 INJECTION, SOLUTION INTRAMUSCULAR; INTRAVENOUS at 18:54

## 2025-08-01 ASSESSMENT — ENCOUNTER SYMPTOMS
SORE THROAT: 0
RHINORRHEA: 0
EYES NEGATIVE: 1
BACK PAIN: 1
ALLERGIC/IMMUNOLOGIC NEGATIVE: 1
BOWEL INCONTINENCE: 0
RESPIRATORY NEGATIVE: 1
ABDOMINAL PAIN: 0

## 2025-08-01 NOTE — PROGRESS NOTES
Katherine Bronson (: 1950) is a 75 y.o. female, New patient, here for evaluation of the following complaint(s): Back Pain (Mid Back pain X 4 days. Painting baseboard 5 days ago, reaching. Hx scoliosis)        History provided by:  Patient  Back Pain  This is a recurrent problem. Episode onset: 5 days ago. The problem occurs 2 to 4 times per day. The problem has been gradually worsening since onset. The pain is present in the lumbar spine. The quality of the pain is described as aching. The pain does not radiate. The pain is at a severity of 6/10. The pain is moderate. The pain is The same all the time. The symptoms are aggravated by bending. Stiffness is present All day. Pertinent negatives include no abdominal pain, bladder incontinence, bowel incontinence, dysuria, fever, headaches, numbness or paresthesias. Risk factors: scoliosis. She has tried analgesics for the symptoms. The treatment provided mild relief.        PAST MEDICAL HISTORY    Past Medical History:   Diagnosis Date    Anemia     Bilateral knee pain     Ebsteins anomaly     Hypothyroidism     Osteoarthritis     DJD bilateral knee    PID (pelvic inflammatory disease)        SURGICAL HISTORY    Past Surgical History:   Procedure Laterality Date    HYSTERECTOMY (CERVIX STATUS UNKNOWN)      THYROIDECTOMY, PARTIAL      TONSILLECTOMY AND ADENOIDECTOMY         CURRENT MEDICATIONS    Current Outpatient Rx   Medication Sig Dispense Refill    diclofenac sodium (VOLTAREN) 1 % GEL Apply 2 g topically 4 times daily (Patient not taking: Reported on 2025) 350 g 0    meloxicam (MOBIC) 7.5 MG tablet Take 1 tablet by mouth daily (Patient not taking: Reported on 2025) 30 tablet 3    diclofenac (VOLTAREN) 50 MG EC tablet Take 1 tablet by mouth 3 times daily (with meals) (Patient not taking: Reported on 2025) 60 tablet 5    ibuprofen (ADVIL;MOTRIN) 800 MG tablet Take 1 tablet by mouth 3 times daily as needed for Pain 42 tablet 0    meloxicam (MOBIC) 15